# Patient Record
Sex: FEMALE | Race: WHITE
[De-identification: names, ages, dates, MRNs, and addresses within clinical notes are randomized per-mention and may not be internally consistent; named-entity substitution may affect disease eponyms.]

---

## 2020-02-05 ENCOUNTER — HOSPITAL ENCOUNTER (OUTPATIENT)
Age: 40
End: 2020-02-05
Payer: COMMERCIAL

## 2020-02-05 DIAGNOSIS — N63.10: ICD-10-CM

## 2020-02-05 DIAGNOSIS — R92.8: Primary | ICD-10-CM

## 2020-02-05 DIAGNOSIS — N60.01: ICD-10-CM

## 2020-02-05 PROCEDURE — 76642 ULTRASOUND BREAST LIMITED: CPT

## 2020-02-05 PROCEDURE — 77066 DX MAMMO INCL CAD BI: CPT

## 2020-02-05 NOTE — DI.MG.S_ITS
BILATERAL DIGITAL DIAGNOSTIC MAMMOGRAM 3D/2D: 2/5/2020  
   
CLINICAL: Baseline exam. Right Breast lump.    
   
No prior exams were available for comparison.  The tissue of both breasts is   
heterogeneously dense. This may lower the sensitivity of mammography.    
   
   
No significant masses, calcifications, or other findings are seen in either breast.    
   
IMPRESSION: INCOMPLETE: NEEDS ADDITIONAL IMAGING EVALUATION  
There is no abnormality seen in the right breast to correspond with the palpable   
abnormality in the upper outer quadrant, however, ultrasound is recommended.    
   
   
   
This exam was interpreted at Station ID: [id number].    
   
NOTE: For mammograms, a report in lay terms will be sent to the patient. Approximately   
15% of breast malignancies will not be visualized mammographically. In the management of   
a palpable breast mass, a negative mammogram must not discourage biopsy of a clinically   
suspicious lesion.  
   
Electronically Signed By: Donn [email or web address]:2/5/2020 13:29:11    
   
   
   
ACR BI-RADS Category 0: Incomplete [id number]

## 2020-02-05 NOTE — DI.US.S_ITS
LIMITED ULTRASOUND OF RIGHT BREAST: 2/5/2020  
CLINICAL: Palpable right breast lump.    
   
[facility]    
Color flow and real-time ultrasound of the right breast 10 o'clock region were performed   
on the areas of interest.    
   
No discrete solid mass lesion identified in the area of palpable abnormality.  
   
An incidental small anechoic well circumscribed oval cyst is noted in the adjacent soft   
tissue measuring up to 0.4 x 0.4 x 0.4 cm. There is posterior acoustic enhancement. No   
internal vascularity on color Doppler interrotation.   
   
IMPRESSION: BENIGN   
There is no sonographic evidence of malignancy.    
   
There is no abnormality seen in the right breast to correspond with the palpable   
abnormality, however, clinical followup is recommended.    
   
An incidental benign appearing simple cyst is noted adjacent to the area of palpable   
abnormality.  
   
A 1 year screening mammogram is recommended.    
   
   
This exam was interpreted at Station ID: [id number].    
Electronically Signed By: Donn Mitchell M.D.    
ddp/:2/5/2020 14:10:52    
   
   
letter sent: Clinical Evaluation    
Ultrasound BI-RADS: 2 Benign

## 2023-08-22 ENCOUNTER — HOSPITAL ENCOUNTER (OUTPATIENT)
Age: 43
End: 2023-08-22
Payer: COMMERCIAL

## 2023-08-22 DIAGNOSIS — F17.200: Primary | ICD-10-CM

## 2023-08-22 DIAGNOSIS — Z13.220: ICD-10-CM

## 2023-08-22 LAB
ADD MANUAL DIFF / SLIDE REVIEW: NO
ALBUMIN SERPL-MCNC: 4.1 G/DL (ref 3.5–5)
ALBUMIN/GLOB SERPL: 1.7 {RATIO} (ref 1–2.8)
ALP SERPL-CCNC: 81 U/L (ref 38–126)
ALT SERPL-CCNC: 29 IU/L (ref ?–35)
BUN SERPL-MCNC: 10 MG/DL (ref 7–17)
CALCIUM SERPL-MCNC: 9.2 MG/DL (ref 8.4–10.2)
CHLORIDE SERPL-SCNC: 109 MMOL/L (ref 98–107)
CHOLEST SERPL-MCNC: 256 MG/DL (ref 140–199)
CO2 SERPL-SCNC: 20 MMOL/L (ref 22–32)
ESTIMATED GLOMERULAR FILT RATE: > 60 ML/MIN (ref 60–?)
GLOBULIN SER CALC-MCNC: 2.4 G/DL (ref 1.7–4.1)
GLUCOSE SERPL-MCNC: 103 MG/DL (ref 70–100)
HDLC SERPL-MCNC: 65 MG/DL (ref 40–60)
HEMATOCRIT: 45 % (ref 36–46)
HEMOGLOBIN: 15.8 G/DL (ref 12–16)
HEMOLYSIS: 24 (ref 0–50)
LYMPHOCYTES # SPEC AUTO: 2400 /UL (ref 1100–4500)
MCV RBC: 94.5 FL (ref 80–100)
MEAN CORPUSCULAR HEMOGLOBIN: 33.2 PG (ref 26–34)
MEAN CORPUSCULAR HGB CONC: 35.1 % (ref 30–36)
PLATELET COUNT: 273 X10^3/UL (ref 150–400)
POTASSIUM SERPL-SCNC: 4.4 MMOL/L (ref 3.4–5.1)
PROT SERPL-MCNC: 6.5 G/DL (ref 6.3–8.2)
SODIUM SERPL-SCNC: 137 MMOL/L (ref 137–145)
TRIGL SERPL-MCNC: 74 MG/DL (ref 35–150)

## 2023-08-22 PROCEDURE — 80053 COMPREHEN METABOLIC PANEL: CPT

## 2023-08-22 PROCEDURE — 85025 COMPLETE CBC W/AUTO DIFF WBC: CPT

## 2023-08-22 PROCEDURE — 80061 LIPID PANEL: CPT

## 2023-09-08 ENCOUNTER — HOSPITAL ENCOUNTER (OUTPATIENT)
Age: 43
End: 2023-09-08
Payer: COMMERCIAL

## 2023-09-08 DIAGNOSIS — F17.200: ICD-10-CM

## 2023-09-08 DIAGNOSIS — J44.9: ICD-10-CM

## 2023-09-08 DIAGNOSIS — R91.8: ICD-10-CM

## 2023-09-08 DIAGNOSIS — Z12.2: Primary | ICD-10-CM

## 2023-09-08 PROCEDURE — 71271 CT THORAX LUNG CANCER SCR C-: CPT

## 2024-11-15 ENCOUNTER — HOSPITAL ENCOUNTER (OUTPATIENT)
Dept: HOSPITAL 73 - CT | Age: 44
End: 2024-11-15
Payer: COMMERCIAL

## 2024-11-15 DIAGNOSIS — R91.8: Primary | ICD-10-CM

## 2024-11-15 DIAGNOSIS — R91.1: ICD-10-CM

## 2024-11-15 DIAGNOSIS — J44.9: ICD-10-CM

## 2024-11-15 PROCEDURE — 71250 CT THORAX DX C-: CPT
